# Patient Record
Sex: FEMALE | Race: BLACK OR AFRICAN AMERICAN | NOT HISPANIC OR LATINO | Employment: PART TIME | URBAN - METROPOLITAN AREA
[De-identification: names, ages, dates, MRNs, and addresses within clinical notes are randomized per-mention and may not be internally consistent; named-entity substitution may affect disease eponyms.]

---

## 2017-02-14 ENCOUNTER — OFFICE VISIT (OUTPATIENT)
Dept: FAMILY MEDICINE CLINIC | Facility: CLINIC | Age: 32
End: 2017-02-14

## 2017-02-14 VITALS
SYSTOLIC BLOOD PRESSURE: 126 MMHG | RESPIRATION RATE: 20 BRPM | TEMPERATURE: 98.8 F | OXYGEN SATURATION: 99 % | BODY MASS INDEX: 34.16 KG/M2 | DIASTOLIC BLOOD PRESSURE: 80 MMHG | HEIGHT: 71 IN | HEART RATE: 98 BPM | WEIGHT: 244 LBS

## 2017-02-14 DIAGNOSIS — K21.9 GASTROESOPHAGEAL REFLUX DISEASE WITHOUT ESOPHAGITIS: ICD-10-CM

## 2017-02-14 DIAGNOSIS — Z00.00 HEALTHCARE MAINTENANCE: ICD-10-CM

## 2017-02-14 DIAGNOSIS — Z91.09 ENVIRONMENTAL ALLERGIES: Primary | ICD-10-CM

## 2017-02-14 DIAGNOSIS — Z71.6 ENCOUNTER FOR SMOKING CESSATION COUNSELING: ICD-10-CM

## 2017-02-14 DIAGNOSIS — Z71.3 DIETARY COUNSELING: ICD-10-CM

## 2017-02-14 PROBLEM — J30.1 SEASONAL ALLERGIC RHINITIS DUE TO POLLEN: Status: ACTIVE | Noted: 2017-02-14

## 2017-02-14 PROBLEM — M62.830 SPASM OF BACK MUSCLES: Status: ACTIVE | Noted: 2017-02-14

## 2017-02-14 PROCEDURE — 99406 BEHAV CHNG SMOKING 3-10 MIN: CPT | Performed by: NURSE PRACTITIONER

## 2017-02-14 PROCEDURE — 99214 OFFICE O/P EST MOD 30 MIN: CPT | Performed by: NURSE PRACTITIONER

## 2017-02-14 RX ORDER — OMEPRAZOLE 20 MG/1
20 CAPSULE, DELAYED RELEASE ORAL DAILY
Qty: 30 CAPSULE | Refills: 3 | Status: SHIPPED | OUTPATIENT
Start: 2017-02-14

## 2017-02-14 RX ORDER — BUPROPION HYDROCHLORIDE 100 MG/1
100 TABLET ORAL DAILY
Qty: 30 TABLET | Refills: 0 | Status: SHIPPED | OUTPATIENT
Start: 2017-02-14

## 2017-02-14 RX ORDER — CETIRIZINE HYDROCHLORIDE 10 MG/1
10 TABLET ORAL DAILY
Qty: 30 TABLET | Refills: 3 | Status: SHIPPED | OUTPATIENT
Start: 2017-02-14

## 2017-02-14 NOTE — PROGRESS NOTES
"Subjective   Simi Casiano is a 32 y.o. female.   Chief Complaint   Patient presents with   • Obesity     would like to discuss options for weight loss medication   • Heartburn     needs refill for medication   • Nicotine Dependence     would like to discuss options for quitting     Vitals:    02/14/17 0855   BP: 126/80   Pulse: 98   Resp: 20   Temp: 98.8 °F (37.1 °C)   SpO2: 99%     No Known Allergies  HPI Comments: Wants to look into weight loss- tries to workout. 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 Diet is admittedly poor- will eat healthy when body starts to respond poorly. Eats fast food almost daily. Usually just \"grazes\" as she cooks dinner.           In the past has tried [facility] weight loss program. It worked but got too expensive (almost $400/month).           Has been on phentermine- barely ate anything, had a lot of energy, but was too hyper to sleep.    Smoking cessation- tried Chantix but had severe tachycardia. Was put on wellbutrin- quit for awhile and was able to come off wellbutrin. Had a slip up with some friends and is now smoking 1.5 packs a week. Was able to quit again for three months but with school stress she would drink coffee and smoke after kids went to bed. Is now finished with school. Has continued to try to quit off and on.                   The following portions of the patient's history were reviewed and updated as appropriate: allergies, current medications, past family history, past medical history, past social history, past surgical history and problem list.    Review of Systems   All other systems reviewed and are negative.      Objective   Physical Exam   Constitutional: She is oriented to person, place, and time. She appears well-developed and well-nourished.   HENT:   Head: Normocephalic and atraumatic.   Right Ear: External ear normal.   Left Ear: External ear normal.   Mouth/Throat: Oropharynx is clear and moist.   Neck: Normal range of motion. Neck supple.   Cardiovascular: Normal " rate, regular rhythm and normal heart sounds.    Pulmonary/Chest: Effort normal and breath sounds normal.   Musculoskeletal: Normal range of motion.   Neurological: She is alert and oriented to person, place, and time.   Skin: Skin is warm and dry.   Psychiatric: She has a normal mood and affect. Her behavior is normal. Judgment and thought content normal.   Nursing note and vitals reviewed.      Assessment/Plan   Problems Addressed this Visit        Digestive    Gastroesophageal reflux disease without esophagitis    Relevant Medications    omeprazole (PRILOSEC) 20 MG capsule      Other Visit Diagnoses     Environmental allergies    -  Primary    Relevant Medications    cetirizine (ZYRTEC ALLERGY) 10 MG tablet    Encounter for smoking cessation counseling        Relevant Medications    buPROPion (WELLBUTRIN) 100 MG tablet    Healthcare maintenance        Relevant Orders    CBC Auto Differential    Lipid panel    Comprehensive metabolic panel    TSH    Dietary counseling            Today we primarily discussed diet management and smoking cessation. With Simi's sporadic eating schedule combined with unhealthy food choices, I feel like lifestyle modification will benefit her more than pharmacologic intervention. I advised that she keep a food log so that she can become more aware of what she is eating. I also gave her a diet/nutrition handout that discusses appropriate portions as well as nutrition facts of common foods. We discussed making the short term goal of decreasing processed foods and eating on a more regular schedule for 4 weeks and discussing on her follow up. We also discussed that if her diet gets better, her GERD may improve as well.   For smoking cessation we discussed other options such as nicotine gums and patches- she has tried them and did not like them. She feels confident that she knows the tips and tricks to quitting she just needs some help which hopefully the wellbutrin will provide.

## 2017-02-19 ENCOUNTER — RESULTS ENCOUNTER (OUTPATIENT)
Dept: FAMILY MEDICINE CLINIC | Facility: CLINIC | Age: 32
End: 2017-02-19

## 2017-02-19 DIAGNOSIS — Z00.00 HEALTHCARE MAINTENANCE: ICD-10-CM

## 2017-08-24 ENCOUNTER — OFFICE VISIT (OUTPATIENT)
Dept: FAMILY MEDICINE CLINIC | Facility: CLINIC | Age: 32
End: 2017-08-24

## 2017-08-24 VITALS
OXYGEN SATURATION: 98 % | BODY MASS INDEX: 36.4 KG/M2 | WEIGHT: 260 LBS | HEIGHT: 71 IN | RESPIRATION RATE: 20 BRPM | SYSTOLIC BLOOD PRESSURE: 122 MMHG | HEART RATE: 83 BPM | DIASTOLIC BLOOD PRESSURE: 78 MMHG

## 2017-08-24 DIAGNOSIS — B37.9 CANDIDA ALBICANS INFECTION: Primary | ICD-10-CM

## 2017-08-24 DIAGNOSIS — L25.9 CONTACT DERMATITIS, UNSPECIFIED CONTACT DERMATITIS TYPE, UNSPECIFIED TRIGGER: ICD-10-CM

## 2017-08-24 DIAGNOSIS — L30.9 ECZEMA, UNSPECIFIED TYPE: ICD-10-CM

## 2017-08-24 PROCEDURE — 99213 OFFICE O/P EST LOW 20 MIN: CPT | Performed by: NURSE PRACTITIONER

## 2017-08-24 RX ORDER — TRIAMCINOLONE ACETONIDE 5 MG/G
OINTMENT TOPICAL 2 TIMES DAILY
Qty: 30 G | Refills: 0 | Status: SHIPPED | OUTPATIENT
Start: 2017-08-24

## 2017-08-24 RX ORDER — FLUCONAZOLE 150 MG/1
150 TABLET ORAL ONCE
Qty: 1 TABLET | Refills: 0 | Status: SHIPPED | OUTPATIENT
Start: 2017-08-24 | End: 2017-08-24

## 2017-08-24 NOTE — PROGRESS NOTES
Subjective   Simi Casiano is a 32 y.o. female.   Chief Complaint   Patient presents with   • Rash     x 4 days, went to  and was given bactrim with no improvment     Vitals:    08/24/17 1638   BP: 122/78   Pulse: 83   Resp: 20   SpO2: 98%     No Known Allergies    HPI Comments: Woke up Sunday with rash. Went to [facility]- was told she had bacteria under her nails and then scratched leg- diagnosed with cellulitis and given bactrim. Has been on bactrim for 4 days with no difference yet.     Rash   This is a new problem. The current episode started in the past 7 days (4 days). The problem is unchanged. The affected locations include the left upper leg. The rash is characterized by pain, redness and itchiness. She was exposed to nothing. Pertinent negatives include no congestion, cough, fatigue, fever, joint pain or nail changes. Past treatments include antibiotics and antibiotic cream. The treatment provided no relief. Her past medical history is significant for eczema.        The following portions of the patient's history were reviewed and updated as appropriate: allergies, current medications, past family history, past medical history, past social history, past surgical history and problem list.    Review of Systems   Constitutional: Negative for fatigue and fever.   HENT: Negative for congestion.    Respiratory: Negative for cough.    Cardiovascular: Negative.    Gastrointestinal: Negative.    Musculoskeletal: Negative.  Negative for joint pain.   Skin: Positive for rash. Negative for nail changes.   Neurological: Negative.    Psychiatric/Behavioral: Negative.    All other systems reviewed and are negative.      Objective   Physical Exam   Constitutional: She is oriented to person, place, and time. She appears well-developed and well-nourished.   HENT:   Head: Normocephalic.   Right Ear: External ear normal.   Left Ear: External ear normal.   Mouth/Throat: Oropharynx is clear and moist.   Cardiovascular: Normal  rate.    Pulmonary/Chest: Effort normal.   Abdominal: Soft.   Musculoskeletal: Normal range of motion.   Neurological: She is alert and oriented to person, place, and time.   Skin: Skin is warm and dry. Rash noted. Rash is papular.        Irregular borders, raised edges, red. No heat   Psychiatric: She has a normal mood and affect. Her behavior is normal. Judgment and thought content normal.   Nursing note and vitals reviewed.      Assessment/Plan   Problems Addressed this Visit     None      Visit Diagnoses     Candida albicans infection    -  Primary    Relevant Medications    fluconazole (DIFLUCAN) 150 MG tablet    Contact dermatitis, unspecified contact dermatitis type, unspecified trigger        Relevant Medications    betamethasone valerate (VALISONE) 0.1 % ointment    Eczema, unspecified type        Relevant Medications    triamcinolone (KENALOG) 0.5 % ointment